# Patient Record
Sex: MALE | Race: WHITE | NOT HISPANIC OR LATINO | Employment: UNEMPLOYED | URBAN - METROPOLITAN AREA
[De-identification: names, ages, dates, MRNs, and addresses within clinical notes are randomized per-mention and may not be internally consistent; named-entity substitution may affect disease eponyms.]

---

## 2017-06-06 ENCOUNTER — OFFICE VISIT (OUTPATIENT)
Dept: MEDICAL GROUP | Facility: MEDICAL CENTER | Age: 52
End: 2017-06-06
Attending: FAMILY MEDICINE
Payer: MEDICAID

## 2017-06-06 ENCOUNTER — HOSPITAL ENCOUNTER (OUTPATIENT)
Dept: LAB | Facility: MEDICAL CENTER | Age: 52
End: 2017-06-06
Attending: FAMILY MEDICINE
Payer: MEDICAID

## 2017-06-06 VITALS
HEART RATE: 64 BPM | SYSTOLIC BLOOD PRESSURE: 126 MMHG | TEMPERATURE: 98.2 F | RESPIRATION RATE: 16 BRPM | DIASTOLIC BLOOD PRESSURE: 84 MMHG | WEIGHT: 183 LBS | HEIGHT: 67 IN | BODY MASS INDEX: 28.72 KG/M2 | OXYGEN SATURATION: 97 %

## 2017-06-06 DIAGNOSIS — G89.29 CHRONIC PAIN OF LEFT HEEL: ICD-10-CM

## 2017-06-06 DIAGNOSIS — G89.29 CHRONIC LEFT HIP PAIN: ICD-10-CM

## 2017-06-06 DIAGNOSIS — M25.552 CHRONIC LEFT HIP PAIN: ICD-10-CM

## 2017-06-06 DIAGNOSIS — Z79.899 HIGH RISK MEDICATION USE: ICD-10-CM

## 2017-06-06 DIAGNOSIS — M79.672 CHRONIC PAIN OF LEFT HEEL: ICD-10-CM

## 2017-06-06 DIAGNOSIS — K21.9 GASTROESOPHAGEAL REFLUX DISEASE WITHOUT ESOPHAGITIS: ICD-10-CM

## 2017-06-06 LAB
ALBUMIN SERPL BCP-MCNC: 4.1 G/DL (ref 3.2–4.9)
ALBUMIN/GLOB SERPL: 1.2 G/DL
ALP SERPL-CCNC: 67 U/L (ref 30–99)
ALT SERPL-CCNC: 10 U/L (ref 2–50)
ANION GAP SERPL CALC-SCNC: 6 MMOL/L (ref 0–11.9)
AST SERPL-CCNC: 13 U/L (ref 12–45)
BILIRUB SERPL-MCNC: 0.3 MG/DL (ref 0.1–1.5)
BUN SERPL-MCNC: 15 MG/DL (ref 8–22)
CALCIUM SERPL-MCNC: 9.5 MG/DL (ref 8.5–10.5)
CHLORIDE SERPL-SCNC: 105 MMOL/L (ref 96–112)
CO2 SERPL-SCNC: 27 MMOL/L (ref 20–33)
CREAT SERPL-MCNC: 0.75 MG/DL (ref 0.5–1.4)
GFR SERPL CREATININE-BSD FRML MDRD: >60 ML/MIN/1.73 M 2
GLOBULIN SER CALC-MCNC: 3.3 G/DL (ref 1.9–3.5)
GLUCOSE SERPL-MCNC: 87 MG/DL (ref 65–99)
POTASSIUM SERPL-SCNC: 4.2 MMOL/L (ref 3.6–5.5)
PROT SERPL-MCNC: 7.4 G/DL (ref 6–8.2)
SODIUM SERPL-SCNC: 138 MMOL/L (ref 135–145)

## 2017-06-06 PROCEDURE — 99212 OFFICE O/P EST SF 10 MIN: CPT | Performed by: FAMILY MEDICINE

## 2017-06-06 PROCEDURE — 99213 OFFICE O/P EST LOW 20 MIN: CPT | Performed by: FAMILY MEDICINE

## 2017-06-06 PROCEDURE — 80053 COMPREHEN METABOLIC PANEL: CPT

## 2017-06-06 PROCEDURE — 36415 COLL VENOUS BLD VENIPUNCTURE: CPT

## 2017-06-06 RX ORDER — IBUPROFEN 800 MG/1
800 TABLET ORAL EVERY 8 HOURS PRN
Qty: 180 TAB | Refills: 3 | Status: SHIPPED | OUTPATIENT
Start: 2017-06-06 | End: 2018-03-06 | Stop reason: SDUPTHER

## 2017-06-06 ASSESSMENT — PATIENT HEALTH QUESTIONNAIRE - PHQ9: CLINICAL INTERPRETATION OF PHQ2 SCORE: 0

## 2017-06-06 NOTE — PROGRESS NOTES
"Subjective:      Andrés Villalta is a 51 y.o. male who presents with Medical Advice; Medication Management; and Results            HPI 1. Chronic pain-patient reports that he enrolled in a methadone clinic at his home in [place] in January 2016, and current methadone dose has been reduced down to 24 mg daily. He attends clinic 3 times per week with daily methadone dosing. Patient reports that he has been chronically taking ibuprofen 800 mg twice daily to deal with the pain generators (low back, left hip, left foot). He reports he was successfully treated for hepatitis C earlier this year with a negative posttreatment hep C RNA assay performed by lab core in [place].  2. GERD-patient notes about 2-3 episodes per month of epigastric burning. Not reporting any black or bloody stools. No recent emesis. Reports normal appetite.    ROS negative for constipation, chest pain, epistaxis       Objective:     /84 mmHg  Pulse 64  Temp(Src) 36.8 °C (98.2 °F)  Resp 16  Ht 1.702 m (5' 7.01\")  Wt 83.008 kg (183 lb)  BMI 28.66 kg/m2  SpO2 97%     Physical Exam  Gen.- alert, cooperative, in no acute distress  Neck- midline trachea, thyroid not enlarged or tender,supple, no cervical adenopathy  Chest-clear to auscultation and percussion with normal breath sounds. No retractions. Chest wall nontender  Cardiac- regular rhythm and rate. No murmur, thrill, or heave  Abdomen- normal bowel sounds, soft without guarding. Liver and spleen not enlarged, no palpable masses or tenderness.  Back-1+ tender to palpation in the bony midline L4-S3. No overlying redness or swelling          Assessment/Plan:     1. Chronic left hip pain      2. Chronic pain of left heel      3. Gastroesophageal reflux disease without esophagitis      4. High risk medication use    - COMP METABOLIC PANEL; Future    Plan: 1. Patient is congratulated on his progress at reducing daily narcotic exposure  2. Check CMP now and every 6 months  3. Add " Nexium 24-hour OTC 20 mg once daily  4. Revisit 6 months  5. Rx ibuprofen 800 mg twice a day with food-GI precautions reviewed

## 2017-06-06 NOTE — MR AVS SNAPSHOT
"        Andrés Villalta   2017 1:10 PM   Office Visit   MRN: [id number]    [facility]   Dept Phone:  [phone]    Description:  Male : 1965   Provider:  Zaire Celis M.D.           Reason for Visit     Medical Advice     Medication Management     Results           Allergies as of 2017     No Known Allergies      You were diagnosed with     Chronic left hip pain   [824710]       Chronic pain of left heel   [8347793]       Gastroesophageal reflux disease without esophagitis   [345272]       High risk medication use   [874823]         Vital Signs     Blood Pressure Pulse Temperature Respirations Height Weight    126/84 mmHg 64 36.8 °C (98.2 °F) 16 1.702 m (5' 7.01\") 83.008 kg (183 lb)    Body Mass Index Oxygen Saturation Smoking Status             28.66 kg/m2 97% Current Some Day Smoker         Basic Information     Date Of Birth Sex Race Ethnicity Preferred Language    1965 Male White Non- English      Problem List              ICD-10-CM Priority Class Noted - Resolved    Heroin user F11.90   2014 - Present    Calcaneal fracture S92.009A   Unknown - Present    Anxiety F41.9   2015 - Present    Recurrent headache R51   2015 - Present    Chronic pain of left heel M79.672, G89.29   2015 - Present    Chronic left hip pain M25.552, G89.29   2015 - Present    Hepatitis C B19.20   2015 - Present    Hepatitis C B19.20   Unknown - Present    Gastroesophageal reflux disease without esophagitis K21.9   2017 - Present      Health Maintenance        Date Due Completion Dates    IMM DTaP/Tdap/Td Vaccine (1 - Tdap) 1984 ---    COLONOSCOPY 2015 ---            Current Immunizations     Influenza Vaccine Quad Inj (Pf) 10/29/2015      Below and/or attached are the medications your provider expects you to take. Review all of your home medications and newly ordered medications with your provider and/or pharmacist. Follow medication " instructions as directed by your provider and/or pharmacist. Please keep your medication list with you and share with your provider. Update the information when medications are discontinued, doses are changed, or new medications (including over-the-counter products) are added; and carry medication information at all times in the event of emergency situations     Allergies:  No Known Allergies          Medications  Valid as of: June 06, 2017 -  1:46 PM    Generic Name Brand Name Tablet Size Instructions for use    ALPRAZolam (Tab) XANAX 0.5 MG Tapering schedule-1 pill up to 3 times per day ×7 days, 2 pills per day ×7 days, 1 pill per day ×7 days and discontinue        Esomeprazole Magnesium (CAPSULE DELAYED RELEASE) NEXIUM 20 MG Take 1 Cap by mouth every morning before breakfast.        HYDROmorphone HCl (Tab) DILAUDID 4 MG Tapering schedule-3 tablets per 24 hours ×3 days, to per 24 hours ×3 days, one per 24 hours ×3 days and stop        Ibuprofen (Tab) MOTRIN 800 MG Take 1 Tab by mouth every 8 hours as needed.        Ibuprofen (Tab) MOTRIN 800 MG Take 1 Tab by mouth every 8 hours as needed.        Meloxicam (Tab) MOBIC 15 MG TAKE 1 TABLET BY MOUTH EVERY DAY        .                 Medicines prescribed today were sent to:     [facility]/PHARMACY #[id number] - [place], [place] - [address]    [address] [address]    Phone: [phone] Fax: [phone]    Open 24 Hours?: No      Medication refill instructions:       If your prescription bottle indicates you have medication refills left, it is not necessary to call your provider’s office. Please contact your pharmacy and they will refill your medication.    If your prescription bottle indicates you do not have any refills left, you may request refills at any time through one of the following ways: The online [employer] system (except Urgent Care), by calling your provider’s office, or by asking your pharmacy to contact your provider’s  office with a refill request. Medication refills are processed only during regular business hours and may not be available until the next business day. Your provider may request additional information or to have a follow-up visit with you prior to refilling your medication.   *Please Note: Medication refills are assigned a new Rx number when refilled electronically. Your pharmacy may indicate that no refills were authorized even though a new prescription for the same medication is available at the pharmacy. Please request the medicine by name with the pharmacy before contacting your provider for a refill.        Your To Do List     Future Labs/Procedures Complete By Expires    COMP METABOLIC PANEL  As directed 6/7/2018      Other Notes About Your Plan     Inconsistent UDS-no further controlled substance prescriptions           MyChart Status: Patient Declined        Quit Tobacco Information     Do you want to quit using tobacco?    Quitting tobacco decreases risks of cancer, heart and lung disease, increases life expectancy, improves sense of taste and smell, and increases spending money, among other benefits.    If you are thinking about quitting, we can help.  • [facility] Quit Tobacco Program: [phone]  o Program occurs weekly for four weeks and includes pharmacist consultation on products to support quitting smoking or chewing tobacco. A provider referral is needed for pharmacist consultation.  • Tobacco Users Help Hotline: 3-477-QUIT-NOW (866-4606) or https://[place].quitlogix.org/  o Free, confidential telephone and online coaching for [place] residents. Sessions are designed on a schedule that is convenient for you. Eligible clients receive free nicotine replacement therapy.  • Nationally: www.smokefree.gov  o Information and professional assistance to support both immediate and long-term needs as you become, and remain, a non-smoker. Smokefree.gov allows you to choose the help that best fits your needs.

## 2017-06-09 ENCOUNTER — TELEPHONE (OUTPATIENT)
Dept: MEDICAL GROUP | Facility: MEDICAL CENTER | Age: 52
End: 2017-06-09

## 2017-06-09 NOTE — TELEPHONE ENCOUNTER
----- Message from Zaire Celis M.D. sent at 6/8/2017 12:41 PM PDT -----  Normal kidney and liver function.

## 2017-08-15 ENCOUNTER — OFFICE VISIT (OUTPATIENT)
Dept: MEDICAL GROUP | Facility: MEDICAL CENTER | Age: 52
End: 2017-08-15
Attending: FAMILY MEDICINE
Payer: MEDICAID

## 2017-08-15 VITALS
SYSTOLIC BLOOD PRESSURE: 128 MMHG | WEIGHT: 182 LBS | RESPIRATION RATE: 18 BRPM | HEART RATE: 78 BPM | BODY MASS INDEX: 28.56 KG/M2 | TEMPERATURE: 97.9 F | DIASTOLIC BLOOD PRESSURE: 78 MMHG | OXYGEN SATURATION: 97 % | HEIGHT: 67 IN

## 2017-08-15 DIAGNOSIS — F41.9 ANXIETY: ICD-10-CM

## 2017-08-15 DIAGNOSIS — K13.0 LESION OF LIP: ICD-10-CM

## 2017-08-15 PROCEDURE — 99214 OFFICE O/P EST MOD 30 MIN: CPT | Performed by: FAMILY MEDICINE

## 2017-08-15 PROCEDURE — 99213 OFFICE O/P EST LOW 20 MIN: CPT | Performed by: FAMILY MEDICINE

## 2017-08-15 RX ORDER — ALPRAZOLAM 0.5 MG/1
0.5 TABLET ORAL
Qty: 30 TAB | Refills: 0 | Status: SHIPPED
Start: 2017-08-15 | End: 2017-09-14 | Stop reason: SDUPTHER

## 2017-08-15 NOTE — MR AVS SNAPSHOT
"        Andrés Villalta   8/15/2017 2:10 PM   Office Visit   MRN: [id number]    Department:  [facility]   Dept Phone:  [phone]    Description:  Male : 1965   Provider:  Zaire Celis M.D.           Reason for Visit     Anxiety Here to follow up on medication      Allergies as of 8/15/2017     No Known Allergies      You were diagnosed with     Anxiety   [139514]       Lesion of lip   [192040]         Vital Signs     Blood Pressure Pulse Temperature Respirations Height Weight    128/78 mmHg 78 36.6 °C (97.9 °F) 18 1.702 m (5' 7\") 82.555 kg (182 lb)    Body Mass Index Oxygen Saturation Smoking Status             28.50 kg/m2 97% Current Some Day Smoker         Basic Information     Date Of Birth Sex Race Ethnicity Preferred Language    1965 Male White Non- English      Your appointments     Sep 15, 2017  2:30 PM   Established Patient with Zaire Celis M.D.   [facility] ([facility])    [address] [place] [id number]   [phone]           You will be receiving a confirmation call a few days before your appointment from our automated call confirmation system.              Problem List              ICD-10-CM Priority Class Noted - Resolved    Heroin user F11.90   2014 - Present    Calcaneal fracture S92.009A   Unknown - Present    Anxiety F41.9   2015 - Present    Recurrent headache R51   2015 - Present    Chronic pain of left heel M79.672, G89.29   2015 - Present    Chronic left hip pain M25.552, G89.29   2015 - Present    Hepatitis C B19.20   2015 - Present    Hepatitis C B19.20   Unknown - Present    Gastroesophageal reflux disease without esophagitis K21.9   2017 - Present      Health Maintenance        Date Due Completion Dates    IMM DTaP/Tdap/Td Vaccine (1 - Tdap) 1984 ---    IMM INFLUENZA (1) 2017 10/29/2015    COLONOSCOPY 2027            Current Immunizations     Influenza Vaccine Quad Inj " (Pf) 10/29/2015      Below and/or attached are the medications your provider expects you to take. Review all of your home medications and newly ordered medications with your provider and/or pharmacist. Follow medication instructions as directed by your provider and/or pharmacist. Please keep your medication list with you and share with your provider. Update the information when medications are discontinued, doses are changed, or new medications (including over-the-counter products) are added; and carry medication information at all times in the event of emergency situations     Allergies:  No Known Allergies          Medications  Valid as of: August 15, 2017 -  2:53 PM    Generic Name Brand Name Tablet Size Instructions for use    ALPRAZolam (Tab) XANAX 0.5 MG Take 1 Tab by mouth 1 time daily as needed for Sleep. Indications: Feeling Anxious        Esomeprazole Magnesium (CAPSULE DELAYED RELEASE) NEXIUM 20 MG Take 1 Cap by mouth every morning before breakfast.        HYDROmorphone HCl (Tab) DILAUDID 4 MG Tapering schedule-3 tablets per 24 hours ×3 days, to per 24 hours ×3 days, one per 24 hours ×3 days and stop        Ibuprofen (Tab) MOTRIN 800 MG Take 1 Tab by mouth every 8 hours as needed.        Ibuprofen (Tab) MOTRIN 800 MG Take 1 Tab by mouth every 8 hours as needed.        Meloxicam (Tab) MOBIC 15 MG TAKE 1 TABLET BY MOUTH EVERY DAY        .                 Medicines prescribed today were sent to:     [facility]/PHARMACY #[id number] - [place], [place] - [address]    [address] [address]    Phone: [phone] Fax: [phone]    Open 24 Hours?: No      Medication refill instructions:       If your prescription bottle indicates you have medication refills left, it is not necessary to call your provider’s office. Please contact your pharmacy and they will refill your medication.    If your prescription bottle indicates you do not have any refills left, you may request  refills at any time through one of the following ways: The online [employer] system (except Urgent Care), by calling your provider’s office, or by asking your pharmacy to contact your provider’s office with a refill request. Medication refills are processed only during regular business hours and may not be available until the next business day. Your provider may request additional information or to have a follow-up visit with you prior to refilling your medication.   *Please Note: Medication refills are assigned a new Rx number when refilled electronically. Your pharmacy may indicate that no refills were authorized even though a new prescription for the same medication is available at the pharmacy. Please request the medicine by name with the pharmacy before contacting your provider for a refill.        Your To Do List     Future Labs/Procedures Complete By [employer] PAIN MANAGEMENT SCREEN  As directed 8/15/2018    Comments:    Current Meds (name, sig, last dose):   Current outpatient prescriptions:   •  ibuprofen, 800 mg, Oral, Q8HRS PRN  •  esomeprazole, 20 mg, Oral, QAM AC  •  HYDROmorphone, Tapering schedule-3 tablets per 24 hours ×3 days, to per 24 hours ×3 days, one per 24 hours ×3 days and stop  •  ibuprofen, 800 mg, Oral, Q8HRS PRN  •  meloxicam, TAKE 1 TABLET BY MOUTH EVERY DAY            Referral     A referral request has been sent to our patient care coordination department. Please allow 3-5 business days for us to process this request and contact you either by phone or mail. If you do not hear from us by the 5th business day, please call us at [phone].        Other Notes About Your Plan     Inconsistent UDS-no further controlled substance prescriptions           MyChart Access Code: Activation code not generated  Current [facility] Status: Active          Quit Tobacco Information     Do you want to quit using tobacco?    Quitting tobacco decreases risks of cancer, heart and lung disease,  increases life expectancy, improves sense of taste and smell, and increases spending money, among other benefits.    If you are thinking about quitting, we can help.  • [facility] Quit Tobacco Program: 228.636.3080  o Program occurs weekly for four weeks and includes pharmacist consultation on products to support quitting smoking or chewing tobacco. A provider referral is needed for pharmacist consultation.  • Tobacco Users Help Hotline: 6-968-QUIT-NOW (093-8963) or https://[place].quitlogix.org/  o Free, confidential telephone and online coaching for [place] residents. Sessions are designed on a schedule that is convenient for you. Eligible clients receive free nicotine replacement therapy.  • Nationally: www.smokefree.gov  o Information and professional assistance to support both immediate and long-term needs as you become, and remain, a non-smoker. Smokefree.gov allows you to choose the help that best fits your needs.

## 2017-08-15 NOTE — PROGRESS NOTES
"Subjective:      Andrés Villalta is a 51 y.o. male who presents with Anxiety            Anxiety         1. Anxiety-patient notes ongoing trials with daily anxiety. He requests restarting a very limited dose of Xanax 0.5 mg. He reports that due to previously saved medications actually been taking that dose typically once a day with good results. It helps to reduce racing thoughts, and overt nervousness.  2. Lip lesion-patient reports 3 week history of a persistent tender white papule that he thought was a canker sore. He reports that it will tend to cough up and fill with a little bit of clear fluid and he has squeezed that out on 2 separate occasions. Has not noticed any traditional pus, not having any fever.  ROS no recalled mouth trauma, sore throat, chest pain       Objective:     /78 mmHg  Pulse 78  Temp(Src) 36.6 °C (97.9 °F)  Resp 18  Ht 1.702 m (5' 7\")  Wt 82.555 kg (182 lb)  BMI 28.50 kg/m2  SpO2 97%     Physical Exam  Gen.- alert, cooperative, in no acute distress  Neck- midline trachea, thyroid not enlarged or tender,supple, no cervical adenopathy  Chest-clear to auscultation and percussion with normal breath sounds. No retractions. Chest wall nontender  Cardiac- regular rhythm and rate. No murmur, thrill, or heave  Psych-normal affect with good eye contact. Normal grooming. Oriented x4.  Asryenbzbf-9-3 mm sharply circumscribed round papule raised in the central portion, seen in the lower inner aspect of the middle of the left side of his lower lip. No other lesions are seen in the oropharynx. Mucosa is moist. Tongue is midline. Submandibular areas are slightly thickened mid mandible bilaterally without clear-cut nodularity          Assessment/Plan:     1. Anxiety    - Controlled Substance Treatment Agreement  - [facility] PAIN MANAGEMENT SCREEN; Future    2. Lesion of lip    Plan: 1. UDS, CSA today  2. Begin alprazolam 0.5 mg once daily-patient is not taking any opiates although he " does recall several doses of Tylenol with Codeine given just transiently a week ago for a dental procedure.  3. May continue on ibuprofen chronically for back and hip pain  4. ENT referral to evaluate lip lesion  5. Revisit with me in one month

## 2017-08-25 ENCOUNTER — PATIENT MESSAGE (OUTPATIENT)
Dept: MEDICAL GROUP | Facility: MEDICAL CENTER | Age: 52
End: 2017-08-25

## 2017-08-25 ENCOUNTER — TELEPHONE (OUTPATIENT)
Dept: MEDICAL GROUP | Facility: MEDICAL CENTER | Age: 52
End: 2017-08-25

## 2017-08-26 NOTE — TELEPHONE ENCOUNTER
----- Message from Zaire Celis M.D. sent at 8/25/2017  5:08 PM PDT -----  Urine drug screen is markedly positive for unexpected residue of marijuana, and heroin metabolites.Pt also does have methadone metabolites consistent with attending methadone clinic. He should discontinue any further use of Xanax/benzodiazepines as these can combine with his opiate exposure to stop his breathing. We will not prescribe further controlled substances but remain available to treat his future medical issues. Recommend he discontinue any heroin use immediately. ( Pt denies any heroin use ).

## 2017-08-26 NOTE — TELEPHONE ENCOUNTER
Phone Number Called: [phone] (home)       Message:Pt informed, and states that he has been [facility] in the last 2 weeks and has tested clean, he thinks our results are wrong he was tested as recently as Monday. He is willing to test again.  Urine drug screen is markedly positive for unexpected residue of marijuana, heroin, heroin metabolites. Patient did not share this information with this. He should discontinue any further use of Xanax/benzodiazepines as these can combine with his opiate exposure to stop his breathing. We will not prescribe further controlled substances but remain available to treat his future medical issues. Recommend he discontinue heroin use immediately.     Left Message for patient to call back: N\A

## 2017-09-14 ENCOUNTER — OFFICE VISIT (OUTPATIENT)
Dept: MEDICAL GROUP | Facility: MEDICAL CENTER | Age: 52
End: 2017-09-14
Attending: FAMILY MEDICINE
Payer: MEDICAID

## 2017-09-14 VITALS
WEIGHT: 181 LBS | OXYGEN SATURATION: 98 % | TEMPERATURE: 98.1 F | HEART RATE: 80 BPM | HEIGHT: 67 IN | BODY MASS INDEX: 28.41 KG/M2 | DIASTOLIC BLOOD PRESSURE: 80 MMHG | SYSTOLIC BLOOD PRESSURE: 140 MMHG | RESPIRATION RATE: 16 BRPM

## 2017-09-14 DIAGNOSIS — F41.9 ANXIETY: ICD-10-CM

## 2017-09-14 DIAGNOSIS — F11.90 METHADONE USE: ICD-10-CM

## 2017-09-14 PROCEDURE — 99213 OFFICE O/P EST LOW 20 MIN: CPT | Performed by: FAMILY MEDICINE

## 2017-09-14 RX ORDER — ALPRAZOLAM 0.5 MG/1
0.5 TABLET ORAL
Qty: 30 TAB | Refills: 0 | Status: SHIPPED | OUTPATIENT
Start: 2017-09-14 | End: 2017-10-19 | Stop reason: SDUPTHER

## 2017-09-14 ASSESSMENT — PAIN SCALES - GENERAL: PAINLEVEL: NO PAIN

## 2017-09-14 NOTE — PROGRESS NOTES
"Subjective:      Andrés Villalta is a 52 y.o. male who presents with Anxiety            HPI 1. Anxiety-patient notes strong levels of anxiety that disturb falling and maintaining sleep. He reports that sleep has been much better since he has been taking Xanax recently as little as 0.5 mg at bedtime. When last months Xanax prescription was authorized I had forgotten it patient has been on methadone since April 2016. Currently he has reduced his methadone dose down to 30 mg and has been stable at that level for approximately 2 months. He is attending a clinic in [place]. He is also scheduled to see a counselor in [place] regarding anxiety and sleep issues.  2. Methadone dependence-patient reports that in the distant past he had taken methadone at a dose of 60 mg daily. Also reports in the distant past he had taken much higher doses of Xanax being prescribed 2 mg 3 times daily by a previous physician. He denies any difficulty breathing, cough, wheeze.    ROS negative for unexplained diaphoresis, pruritus, nasal congestion       Objective:     /80   Pulse 80   Temp 36.7 °C (98.1 °F)   Resp 16   Ht 1.702 m (5' 7\")   Wt 82.1 kg (181 lb)   SpO2 98%   BMI 28.35 kg/m²      Physical Exam  Gen.- alert, cooperative, in no acute distress  Neck- midline trachea, thyroid not enlarged or tender,supple, no cervical adenopathy  Chest-clear to auscultation and percussion with normal breath sounds. No retractions. Chest wall nontender  Cardiac- regular rhythm and rate. No murmur, thrill, or heave  Psych-normal affect with good eye contact. Normal grooming. Oriented x4.  [facility] EDS-reviewed     Obtained and reviewed patient utilization report from State Pharmacy database on9/14/17, and based on assessment of report, the prescription for Xanax 0.5 mg is medically necessary       Assessment/Plan:     1. Anxiety      2. Methadone use (CMS-[facility])    Plan: 1. Patient believes he may be able to transfer his " Xanax prescription to the upcoming psychology clinic he is entering in [place]  2. Discussed the potential for respiratory depression combining Xanax with benzodiazepines-in the past patient has taken significantly higher of both medications with no obvious respiratory consequences. Will renew Xanax 0.5 mg, #30 this month.

## 2017-10-19 ENCOUNTER — OFFICE VISIT (OUTPATIENT)
Dept: MEDICAL GROUP | Facility: MEDICAL CENTER | Age: 52
End: 2017-10-19
Attending: FAMILY MEDICINE
Payer: MEDICAID

## 2017-10-19 VITALS
HEIGHT: 67 IN | RESPIRATION RATE: 16 BRPM | BODY MASS INDEX: 28.25 KG/M2 | SYSTOLIC BLOOD PRESSURE: 122 MMHG | HEART RATE: 72 BPM | OXYGEN SATURATION: 98 % | TEMPERATURE: 97.5 F | DIASTOLIC BLOOD PRESSURE: 80 MMHG | WEIGHT: 180 LBS

## 2017-10-19 DIAGNOSIS — F11.90 METHADONE USE: ICD-10-CM

## 2017-10-19 DIAGNOSIS — F41.9 ANXIETY: ICD-10-CM

## 2017-10-19 PROCEDURE — 99213 OFFICE O/P EST LOW 20 MIN: CPT | Performed by: FAMILY MEDICINE

## 2017-10-19 RX ORDER — ALPRAZOLAM 0.5 MG/1
0.5 TABLET ORAL
Qty: 30 TAB | Refills: 0 | Status: SHIPPED | OUTPATIENT
Start: 2017-10-19 | End: 2017-11-17 | Stop reason: SDUPTHER

## 2017-10-19 ASSESSMENT — PAIN SCALES - GENERAL: PAINLEVEL: NO PAIN

## 2017-10-19 NOTE — PROGRESS NOTES
"Subjective:      Andrés Villalta is a 52 y.o. male who presents with Anxiety            HPI 1. Anxiety-patient reports he is continuing to see Froilan, his counselor every Tuesday, in [place]. He Is Scheduled to See His New Psychiatrist, Dr. Alvarez, on 10/31/17. Patient anticipates Dr. Alvarez will take over prescribing his Xanax. Currently taking Xanax 0.5 mg typically at bedtime on most days. Reports he is getting 4-5 hours of sleep with some sleep fragmentation. Reports this is about average for him. He continues to take a daily dose of methadone but has been decreasing the dose by 1 mg less each week currently down to 26 mg daily.    ROS negative for tearfulness, confusion, headache       Objective:     /80   Pulse 72   Temp 36.4 °C (97.5 °F)   Resp 16   Ht 1.702 m (5' 7.01\")   Wt 81.6 kg (180 lb)   SpO2 98%   BMI 28.19 kg/m²      Physical Exam  Gen.- alert, cooperative, in no acute distress  Neck- midline trachea, thyroid not enlarged or tender,supple, no cervical adenopathy  Chest-clear to auscultation and percussion with normal breath sounds. No retractions. Chest wall nontender  Cardiac- regular rhythm and rate. No murmur, thrill, or heave      Attempted to obtain patient utilization report from the State Pharmacy database on 10/19/17. Unable to access the database at this time. Based on assessment of patient and review of available medical record the prescription for Alprazolam 0.5 mg is medically necessary         Assessment/Plan:     1. Anxiety      2. Methadone use (CMS-[facility])     Plan: 1.  could not be accessed today  2. Renew Xanax 0.5 mg, #30-1 by mouth daily as needed for anxiety  3. Follow-up with new psychiatrist    "

## 2017-11-17 RX ORDER — ALPRAZOLAM 0.5 MG/1
0.5 TABLET ORAL
Qty: 30 TAB | Refills: 0 | Status: SHIPPED | OUTPATIENT
Start: 2017-11-17 | End: 2019-01-07

## 2017-11-17 NOTE — TELEPHONE ENCOUNTER
Was the patient seen in the last year in this department? Yes     Does patient have an active prescription for medications requested? No     Received Request Via: Patient, pt has an appointment with his psychiatrist on November 28 th.

## 2018-03-06 RX ORDER — IBUPROFEN 800 MG/1
800 TABLET ORAL EVERY 8 HOURS PRN
Qty: 180 TAB | Refills: 1 | Status: SHIPPED | OUTPATIENT
Start: 2018-03-06 | End: 2019-01-07 | Stop reason: SDUPTHER

## 2018-09-04 RX ORDER — IBUPROFEN 800 MG/1
800 TABLET ORAL EVERY 8 HOURS PRN
Qty: 90 TAB | Refills: 1 | Status: SHIPPED | OUTPATIENT
Start: 2018-09-04 | End: 2018-11-02 | Stop reason: SDUPTHER

## 2018-12-03 RX ORDER — IBUPROFEN 800 MG/1
TABLET ORAL
Qty: 90 TAB | Refills: 0 | Status: SHIPPED | OUTPATIENT
Start: 2018-12-03 | End: 2019-01-03 | Stop reason: SDUPTHER

## 2019-01-03 RX ORDER — IBUPROFEN 800 MG/1
800 TABLET ORAL EVERY 8 HOURS PRN
Qty: 68 TAB | Refills: 0 | Status: SHIPPED | OUTPATIENT
Start: 2019-01-03 | End: 2019-01-07

## 2019-01-03 NOTE — TELEPHONE ENCOUNTER
1. Caller Name: Andrés Villalta                                           Call Back Number: [phone] (home)         Patient approves a detailed voicemail message: yes    Pt called and wanted to know why his Ibuprofen was denied by the office. Told pt apt was necessary. Pt has scheduled his apt for 01/21/18, that is the soonest pt is able to come into the office for both providers schedule and pt's schedule. Pt is requesting an Rx for ibuprofen to last until pt is seen. Pt is currently out of Rx. Please advise.

## 2019-01-07 ENCOUNTER — OFFICE VISIT (OUTPATIENT)
Dept: MEDICAL GROUP | Facility: MEDICAL CENTER | Age: 54
End: 2019-01-07
Attending: FAMILY MEDICINE
Payer: MEDICAID

## 2019-01-07 VITALS
HEART RATE: 80 BPM | TEMPERATURE: 97.6 F | WEIGHT: 180 LBS | RESPIRATION RATE: 16 BRPM | BODY MASS INDEX: 28.25 KG/M2 | SYSTOLIC BLOOD PRESSURE: 128 MMHG | HEIGHT: 67 IN | OXYGEN SATURATION: 97 % | DIASTOLIC BLOOD PRESSURE: 72 MMHG

## 2019-01-07 DIAGNOSIS — M25.552 CHRONIC LEFT HIP PAIN: ICD-10-CM

## 2019-01-07 DIAGNOSIS — G89.29 CHRONIC LEFT HIP PAIN: ICD-10-CM

## 2019-01-07 DIAGNOSIS — Z51.81 ENCOUNTER FOR MONITORING CHRONIC NSAID THERAPY: ICD-10-CM

## 2019-01-07 DIAGNOSIS — Z79.1 ENCOUNTER FOR MONITORING CHRONIC NSAID THERAPY: ICD-10-CM

## 2019-01-07 PROCEDURE — 99212 OFFICE O/P EST SF 10 MIN: CPT | Performed by: FAMILY MEDICINE

## 2019-01-07 PROCEDURE — 99213 OFFICE O/P EST LOW 20 MIN: CPT | Performed by: FAMILY MEDICINE

## 2019-01-07 RX ORDER — IBUPROFEN 800 MG/1
800 TABLET ORAL EVERY 8 HOURS PRN
Qty: 270 TAB | Refills: 3 | Status: SHIPPED | OUTPATIENT
Start: 2019-01-07 | End: 2019-12-12 | Stop reason: SDUPTHER

## 2019-01-08 NOTE — PROGRESS NOTES
"Subjective:      Andrés Villalta is a 53 y.o. male who presents with Follow-Up            HPI 1.  Chronic hip pain-patient reports that methadone has been tapered down to 20 mg daily.  He reports he takes no benzodiazepines any longer.  He is followed in [place] for the methadone therapy.  He does also continue to take ibuprofen 800 mg 3 times daily to help control hip pain.  He does have a chronic limp and uses a cane for assistance.  There have been no recent falls.  There is no urinary or fecal incontinence    ROS negative for black stools, bloody stools, nausea, epigastric pain, water brash       Objective:     /72 (BP Location: Left arm, Patient Position: Sitting, BP Cuff Size: Adult)   Pulse 80   Temp 36.4 °C (97.6 °F) (Temporal)   Resp 16   Ht 1.702 m (5' 7\")   Wt 81.6 kg (180 lb)   SpO2 97%   BMI 28.19 kg/m²      Physical Exam  Gen.- alert, cooperative, in no acute distress  Neck- midline trachea, thyroid not enlarged or tender,supple, no cervical adenopathy  Chest-clear to auscultation and percussion with normal breath sounds. No retractions. Chest wall nontender  Cardiac- regular rhythm and rate. No murmur, thrill, or heave  Abdomen- normal bowel sounds, soft without guarding. Liver and spleen not enlarged, no palpable masses or tenderness.  Psych-normal affect with good eye contact. Normal grooming. Oriented x4.            Assessment/Plan:     1. Chronic left hip pain      2. Encounter for monitoring chronic NSAID therapy    - COMP METABOLIC PANEL; Future  - CBC WITH DIFFERENTIAL; Future    Plan: 1.  Check CMP, CBC  2.  Rx ibuprofen 800 mg #270 with 3 refills  3.  Revisit within 6-12 months or sooner if needed  "

## 2019-10-30 ENCOUNTER — HOSPITAL ENCOUNTER (OUTPATIENT)
Dept: LAB | Facility: MEDICAL CENTER | Age: 54
End: 2019-10-30
Attending: FAMILY MEDICINE
Payer: MEDICAID

## 2019-10-30 DIAGNOSIS — Z51.81 ENCOUNTER FOR MONITORING CHRONIC NSAID THERAPY: ICD-10-CM

## 2019-10-30 DIAGNOSIS — Z79.1 ENCOUNTER FOR MONITORING CHRONIC NSAID THERAPY: ICD-10-CM

## 2019-10-30 LAB
BASOPHILS # BLD AUTO: 0.4 % (ref 0–1.8)
BASOPHILS # BLD: 0.03 K/UL (ref 0–0.12)
EOSINOPHIL # BLD AUTO: 0.11 K/UL (ref 0–0.51)
EOSINOPHIL NFR BLD: 1.6 % (ref 0–6.9)
ERYTHROCYTE [DISTWIDTH] IN BLOOD BY AUTOMATED COUNT: 43.5 FL (ref 35.9–50)
HCT VFR BLD AUTO: 41.9 % (ref 42–52)
HGB BLD-MCNC: 13.3 G/DL (ref 14–18)
IMM GRANULOCYTES # BLD AUTO: 0.03 K/UL (ref 0–0.11)
IMM GRANULOCYTES NFR BLD AUTO: 0.4 % (ref 0–0.9)
LYMPHOCYTES # BLD AUTO: 1.93 K/UL (ref 1–4.8)
LYMPHOCYTES NFR BLD: 28.5 % (ref 22–41)
MCH RBC QN AUTO: 28 PG (ref 27–33)
MCHC RBC AUTO-ENTMCNC: 31.7 G/DL (ref 33.7–35.3)
MCV RBC AUTO: 88.2 FL (ref 81.4–97.8)
MONOCYTES # BLD AUTO: 0.55 K/UL (ref 0–0.85)
MONOCYTES NFR BLD AUTO: 8.1 % (ref 0–13.4)
NEUTROPHILS # BLD AUTO: 4.13 K/UL (ref 1.82–7.42)
NEUTROPHILS NFR BLD: 61 % (ref 44–72)
NRBC # BLD AUTO: 0 K/UL
NRBC BLD-RTO: 0 /100 WBC
PLATELET # BLD AUTO: 229 K/UL (ref 164–446)
PMV BLD AUTO: 10.8 FL (ref 9–12.9)
RBC # BLD AUTO: 4.75 M/UL (ref 4.7–6.1)
WBC # BLD AUTO: 6.8 K/UL (ref 4.8–10.8)

## 2019-10-30 PROCEDURE — 36415 COLL VENOUS BLD VENIPUNCTURE: CPT

## 2019-10-30 PROCEDURE — 85025 COMPLETE CBC W/AUTO DIFF WBC: CPT

## 2019-10-31 ENCOUNTER — PATIENT MESSAGE (OUTPATIENT)
Dept: MEDICAL GROUP | Facility: MEDICAL CENTER | Age: 54
End: 2019-10-31

## 2019-10-31 ENCOUNTER — TELEPHONE (OUTPATIENT)
Dept: MEDICAL GROUP | Facility: MEDICAL CENTER | Age: 54
End: 2019-10-31

## 2019-10-31 DIAGNOSIS — D64.9 NORMOCYTIC NORMOCHROMIC ANEMIA: ICD-10-CM

## 2019-12-12 ENCOUNTER — OFFICE VISIT (OUTPATIENT)
Dept: MEDICAL GROUP | Facility: MEDICAL CENTER | Age: 54
End: 2019-12-12
Attending: FAMILY MEDICINE
Payer: MEDICAID

## 2019-12-12 VITALS
HEIGHT: 67 IN | OXYGEN SATURATION: 98 % | HEART RATE: 64 BPM | SYSTOLIC BLOOD PRESSURE: 122 MMHG | TEMPERATURE: 97.8 F | WEIGHT: 184 LBS | BODY MASS INDEX: 28.88 KG/M2 | RESPIRATION RATE: 16 BRPM | DIASTOLIC BLOOD PRESSURE: 78 MMHG

## 2019-12-12 DIAGNOSIS — Z23 NEEDS FLU SHOT: ICD-10-CM

## 2019-12-12 DIAGNOSIS — D64.9 NORMOCYTIC NORMOCHROMIC ANEMIA: ICD-10-CM

## 2019-12-12 DIAGNOSIS — Z23 NEED FOR VACCINATION AGAINST STREPTOCOCCUS PNEUMONIAE: ICD-10-CM

## 2019-12-12 DIAGNOSIS — G89.29 CHRONIC LEFT HIP PAIN: ICD-10-CM

## 2019-12-12 DIAGNOSIS — M25.552 CHRONIC LEFT HIP PAIN: ICD-10-CM

## 2019-12-12 DIAGNOSIS — Z79.899 HIGH RISK MEDICATION USE: ICD-10-CM

## 2019-12-12 PROCEDURE — 90686 IIV4 VACC NO PRSV 0.5 ML IM: CPT | Performed by: FAMILY MEDICINE

## 2019-12-12 PROCEDURE — 99212 OFFICE O/P EST SF 10 MIN: CPT | Performed by: FAMILY MEDICINE

## 2019-12-12 PROCEDURE — 90732 PPSV23 VACC 2 YRS+ SUBQ/IM: CPT | Performed by: FAMILY MEDICINE

## 2019-12-12 PROCEDURE — 99213 OFFICE O/P EST LOW 20 MIN: CPT | Performed by: FAMILY MEDICINE

## 2019-12-12 RX ORDER — IBUPROFEN 800 MG/1
800 TABLET ORAL EVERY 8 HOURS PRN
Qty: 270 TAB | Refills: 3 | Status: SHIPPED | OUTPATIENT
Start: 2019-12-12 | End: 2020-12-02

## 2019-12-12 NOTE — PROGRESS NOTES
"Subjective:      Andrés Villalta is a 54 y.o. male who presents with Follow-Up            HPI 1.  Chronic left hip pain-patient reports he continues on methadone at 30 mg daily.  He greatly enjoys the weekly counseling that he gets and is uncomfortable with the thought of eliminating his methadone dose and losing access to the counselor.  Also is taking ibuprofen 800 mg 3 times daily.  Not reporting any GI upset, black or bloody stools or loss of appetite.  2.  Anemia-patient has not noticed any visible blood in his stool, urine, or recurrent prolonged epistaxis.  Labs at the end of October was notable for mild dip in hemoglobin at 13.3.  Patient does not recall prior history of anemia    ROS negative for dyspnea, chest pain, abdominal pain       Objective:     /78 (BP Location: Left arm, Patient Position: Sitting, BP Cuff Size: Adult)   Pulse 64   Temp 36.6 °C (97.8 °F) (Temporal)   Resp 16   Ht 1.702 m (5' 7.01\")   Wt 83.5 kg (184 lb)   SpO2 98%   BMI 28.81 kg/m²      Physical Exam  Gen.- alert, cooperative, in no acute distress  Neck- midline trachea, thyroid not enlarged or tender,supple, no cervical adenopathy  Chest-clear to auscultation and percussion with normal breath sounds. No retractions. Chest wall nontender  Cardiac- regular rhythm and rate. No murmur, thrill, or heave  Abdomen- normal bowel sounds, soft without guarding. Liver and spleen not enlarged, no palpable masses or tenderness.          Assessment/Plan:       1. Normocytic normochromic anemia      2. High risk medication use      3. Chronic left hip pain    Plan: 1.  May continue ibuprofen-GI risks reviewed and patient advised to take it with food  2.  Continue methadone program  3.  Update CBC and CMP  "

## 2020-01-09 ENCOUNTER — HOSPITAL ENCOUNTER (OUTPATIENT)
Dept: LAB | Facility: MEDICAL CENTER | Age: 55
End: 2020-01-09
Attending: FAMILY MEDICINE
Payer: MEDICAID

## 2020-01-09 DIAGNOSIS — D64.9 NORMOCYTIC NORMOCHROMIC ANEMIA: ICD-10-CM

## 2020-01-09 DIAGNOSIS — Z79.899 HIGH RISK MEDICATION USE: ICD-10-CM

## 2020-01-09 LAB
ALBUMIN SERPL BCP-MCNC: 4.5 G/DL (ref 3.2–4.9)
ALBUMIN/GLOB SERPL: 1.4 G/DL
ALP SERPL-CCNC: 76 U/L (ref 30–99)
ALT SERPL-CCNC: 10 U/L (ref 2–50)
ANION GAP SERPL CALC-SCNC: 6 MMOL/L (ref 0–11.9)
AST SERPL-CCNC: 18 U/L (ref 12–45)
BASOPHILS # BLD AUTO: 0.5 % (ref 0–1.8)
BASOPHILS # BLD: 0.05 K/UL (ref 0–0.12)
BILIRUB SERPL-MCNC: 0.6 MG/DL (ref 0.1–1.5)
BUN SERPL-MCNC: 11 MG/DL (ref 8–22)
CALCIUM SERPL-MCNC: 9.3 MG/DL (ref 8.5–10.5)
CHLORIDE SERPL-SCNC: 104 MMOL/L (ref 96–112)
CO2 SERPL-SCNC: 27 MMOL/L (ref 20–33)
CREAT SERPL-MCNC: 0.75 MG/DL (ref 0.5–1.4)
EOSINOPHIL # BLD AUTO: 0.1 K/UL (ref 0–0.51)
EOSINOPHIL NFR BLD: 1 % (ref 0–6.9)
ERYTHROCYTE [DISTWIDTH] IN BLOOD BY AUTOMATED COUNT: 43.3 FL (ref 35.9–50)
GLOBULIN SER CALC-MCNC: 3.2 G/DL (ref 1.9–3.5)
GLUCOSE SERPL-MCNC: 82 MG/DL (ref 65–99)
HCT VFR BLD AUTO: 44.7 % (ref 42–52)
HGB BLD-MCNC: 14.1 G/DL (ref 14–18)
IMM GRANULOCYTES # BLD AUTO: 0.03 K/UL (ref 0–0.11)
IMM GRANULOCYTES NFR BLD AUTO: 0.3 % (ref 0–0.9)
LYMPHOCYTES # BLD AUTO: 2.16 K/UL (ref 1–4.8)
LYMPHOCYTES NFR BLD: 22.5 % (ref 22–41)
MCH RBC QN AUTO: 28.3 PG (ref 27–33)
MCHC RBC AUTO-ENTMCNC: 31.5 G/DL (ref 33.7–35.3)
MCV RBC AUTO: 89.6 FL (ref 81.4–97.8)
MONOCYTES # BLD AUTO: 0.63 K/UL (ref 0–0.85)
MONOCYTES NFR BLD AUTO: 6.6 % (ref 0–13.4)
NEUTROPHILS # BLD AUTO: 6.61 K/UL (ref 1.82–7.42)
NEUTROPHILS NFR BLD: 69.1 % (ref 44–72)
NRBC # BLD AUTO: 0 K/UL
NRBC BLD-RTO: 0 /100 WBC
PLATELET # BLD AUTO: 265 K/UL (ref 164–446)
PMV BLD AUTO: 10.9 FL (ref 9–12.9)
POTASSIUM SERPL-SCNC: 4 MMOL/L (ref 3.6–5.5)
PROT SERPL-MCNC: 7.7 G/DL (ref 6–8.2)
RBC # BLD AUTO: 4.99 M/UL (ref 4.7–6.1)
SODIUM SERPL-SCNC: 137 MMOL/L (ref 135–145)
WBC # BLD AUTO: 9.6 K/UL (ref 4.8–10.8)

## 2020-01-09 PROCEDURE — 36415 COLL VENOUS BLD VENIPUNCTURE: CPT

## 2020-01-09 PROCEDURE — 85025 COMPLETE CBC W/AUTO DIFF WBC: CPT

## 2020-01-09 PROCEDURE — 80053 COMPREHEN METABOLIC PANEL: CPT

## 2020-12-02 DIAGNOSIS — G89.29 CHRONIC LEFT HIP PAIN: ICD-10-CM

## 2020-12-02 DIAGNOSIS — M25.552 CHRONIC LEFT HIP PAIN: ICD-10-CM

## 2020-12-03 RX ORDER — IBUPROFEN 800 MG/1
TABLET ORAL
Qty: 270 TAB | Refills: 0 | Status: SHIPPED | OUTPATIENT
Start: 2020-12-03 | End: 2021-02-01

## 2021-03-04 ENCOUNTER — PATIENT MESSAGE (OUTPATIENT)
Dept: MEDICAL GROUP | Facility: MEDICAL CENTER | Age: 56
End: 2021-03-04

## 2021-03-04 DIAGNOSIS — B02.9 HERPES ZOSTER WITHOUT COMPLICATION: ICD-10-CM

## 2021-03-04 RX ORDER — HYDROCODONE BITARTRATE AND ACETAMINOPHEN 5; 325 MG/1; MG/1
1 TABLET ORAL EVERY 8 HOURS PRN
Qty: 30 TABLET | Refills: 0 | Status: SHIPPED | OUTPATIENT
Start: 2021-03-04 | End: 2021-03-14

## 2021-03-05 ENCOUNTER — TELEPHONE (OUTPATIENT)
Dept: MEDICAL GROUP | Facility: MEDICAL CENTER | Age: 56
End: 2021-03-05

## 2021-03-08 ENCOUNTER — TELEPHONE (OUTPATIENT)
Dept: MEDICAL GROUP | Facility: MEDICAL CENTER | Age: 56
End: 2021-03-08

## 2021-03-09 NOTE — TELEPHONE ENCOUNTER
DOCUMENTATION OF PAR STATUS:    1. Name of Medication & Dose: hydrocodone acetmin 5-325 mg     2. Name of Prescription Coverage Company & phone #: [facility]    3. Date Prior Auth Submitted: 3/8/2021     4. What information was given to obtain insurance decision? Medication list    5. Prior Auth Status? Pending    6. Patient Notified: N\A

## 2021-03-18 ENCOUNTER — PATIENT MESSAGE (OUTPATIENT)
Dept: MEDICAL GROUP | Facility: MEDICAL CENTER | Age: 56
End: 2021-03-18

## 2021-04-08 ENCOUNTER — OFFICE VISIT (OUTPATIENT)
Dept: MEDICAL GROUP | Facility: MEDICAL CENTER | Age: 56
End: 2021-04-08
Attending: FAMILY MEDICINE
Payer: MEDICAID

## 2021-04-08 VITALS
SYSTOLIC BLOOD PRESSURE: 118 MMHG | OXYGEN SATURATION: 96 % | DIASTOLIC BLOOD PRESSURE: 64 MMHG | WEIGHT: 174 LBS | HEIGHT: 67 IN | TEMPERATURE: 98.1 F | HEART RATE: 92 BPM | BODY MASS INDEX: 27.31 KG/M2 | RESPIRATION RATE: 16 BRPM

## 2021-04-08 DIAGNOSIS — Z00.00 HEALTHCARE MAINTENANCE: ICD-10-CM

## 2021-04-08 DIAGNOSIS — Z79.899 HIGH RISK MEDICATION USE: ICD-10-CM

## 2021-04-08 PROCEDURE — 99213 OFFICE O/P EST LOW 20 MIN: CPT | Performed by: FAMILY MEDICINE

## 2021-04-08 RX ORDER — METHADONE HYDROCHLORIDE 10 MG/5ML
20 SOLUTION ORAL EVERY 8 HOURS
COMMUNITY
End: 2022-02-28

## 2021-04-08 ASSESSMENT — PATIENT HEALTH QUESTIONNAIRE - PHQ9: CLINICAL INTERPRETATION OF PHQ2 SCORE: 0

## 2021-04-08 ASSESSMENT — FIBROSIS 4 INDEX: FIB4 SCORE: 1.18

## 2021-04-08 NOTE — PROGRESS NOTES
"Subjective:      Andrés Villatla is a 55 y.o. male who presents with Follow-Up            HPI 1. High risk medication use-patient returns for follow-up using ibuprofen 800 mg for chronic left hip and left heel pain. Patient reports that his use is actually diminished taking between 1 to 3 tablets/day. He is also still on methadone although that dosage has been reduced as well down to 20 mg and he is planning on discontinuing methadone completely in the next 2 months. Not reporting any black or bloody stools or epigastric pain.    ROS patient notes allergic runny nose which he treats with as needed Afrin. (Previous trial of Flonase was ineffective and he feels odd when he takes oral Claritin). Negative for chest pain, ankle edema       Objective:     /64 (BP Location: Left arm, Patient Position: Sitting, BP Cuff Size: Adult)   Pulse 92   Temp 36.7 °C (98.1 °F) (Temporal)   Resp 16   Ht 1.702 m (5' 7.01\")   Wt 78.9 kg (174 lb)   SpO2 96%   BMI 27.25 kg/m²      Physical Exam  Gen.- alert, cooperative, in no acute distress  Neck- midline trachea, thyroid not enlarged or tender,supple, no cervical adenopathy  Chest-clear to auscultation and percussion with normal breath sounds. No retractions. Chest wall nontender  Cardiac- regular rhythm and rate. No murmur, thrill, or heave  Abdomen- normal bowel sounds, soft without guarding. Liver and spleen not enlarged, no palpable masses or tenderness.          Assessment/Plan:        1. High risk medication use    - Comp Metabolic Panel; Future  - CBC WITH DIFFERENTIAL; Future    2. Healthcare maintenance    - Lipid Profile; Future  Plan: 1. Update fasting lipids, CMP, CBC  2. Follow-up with me within 1 year  "

## 2021-05-28 ENCOUNTER — HOSPITAL ENCOUNTER (OUTPATIENT)
Dept: LAB | Facility: MEDICAL CENTER | Age: 56
End: 2021-05-28
Attending: FAMILY MEDICINE
Payer: MEDICAID

## 2021-05-28 DIAGNOSIS — Z00.00 HEALTHCARE MAINTENANCE: ICD-10-CM

## 2021-05-28 DIAGNOSIS — Z79.899 HIGH RISK MEDICATION USE: ICD-10-CM

## 2021-05-28 LAB
BASOPHILS # BLD AUTO: 0.6 % (ref 0–1.8)
BASOPHILS # BLD: 0.04 K/UL (ref 0–0.12)
EOSINOPHIL # BLD AUTO: 0.15 K/UL (ref 0–0.51)
EOSINOPHIL NFR BLD: 2.3 % (ref 0–6.9)
ERYTHROCYTE [DISTWIDTH] IN BLOOD BY AUTOMATED COUNT: 41.6 FL (ref 35.9–50)
HCT VFR BLD AUTO: 40 % (ref 42–52)
HGB BLD-MCNC: 12.8 G/DL (ref 14–18)
IMM GRANULOCYTES # BLD AUTO: 0.03 K/UL (ref 0–0.11)
IMM GRANULOCYTES NFR BLD AUTO: 0.5 % (ref 0–0.9)
LYMPHOCYTES # BLD AUTO: 1.6 K/UL (ref 1–4.8)
LYMPHOCYTES NFR BLD: 24.5 % (ref 22–41)
MCH RBC QN AUTO: 28 PG (ref 27–33)
MCHC RBC AUTO-ENTMCNC: 32 G/DL (ref 33.7–35.3)
MCV RBC AUTO: 87.5 FL (ref 81.4–97.8)
MONOCYTES # BLD AUTO: 0.62 K/UL (ref 0–0.85)
MONOCYTES NFR BLD AUTO: 9.5 % (ref 0–13.4)
NEUTROPHILS # BLD AUTO: 4.1 K/UL (ref 1.82–7.42)
NEUTROPHILS NFR BLD: 62.6 % (ref 44–72)
NRBC # BLD AUTO: 0 K/UL
NRBC BLD-RTO: 0 /100 WBC
PLATELET # BLD AUTO: 210 K/UL (ref 164–446)
PMV BLD AUTO: 10.8 FL (ref 9–12.9)
RBC # BLD AUTO: 4.57 M/UL (ref 4.7–6.1)
WBC # BLD AUTO: 6.5 K/UL (ref 4.8–10.8)

## 2021-05-28 PROCEDURE — 80053 COMPREHEN METABOLIC PANEL: CPT

## 2021-05-28 PROCEDURE — 36415 COLL VENOUS BLD VENIPUNCTURE: CPT

## 2021-05-28 PROCEDURE — 85025 COMPLETE CBC W/AUTO DIFF WBC: CPT

## 2021-05-28 PROCEDURE — 80061 LIPID PANEL: CPT

## 2021-05-29 LAB
ALBUMIN SERPL BCP-MCNC: 4.1 G/DL (ref 3.2–4.9)
ALBUMIN/GLOB SERPL: 1.2 G/DL
ALP SERPL-CCNC: 87 U/L (ref 30–99)
ALT SERPL-CCNC: 18 U/L (ref 2–50)
ANION GAP SERPL CALC-SCNC: 8 MMOL/L (ref 7–16)
AST SERPL-CCNC: 20 U/L (ref 12–45)
BILIRUB SERPL-MCNC: 0.3 MG/DL (ref 0.1–1.5)
BUN SERPL-MCNC: 17 MG/DL (ref 8–22)
CALCIUM SERPL-MCNC: 9.2 MG/DL (ref 8.5–10.5)
CHLORIDE SERPL-SCNC: 106 MMOL/L (ref 96–112)
CHOLEST SERPL-MCNC: 176 MG/DL (ref 100–199)
CO2 SERPL-SCNC: 26 MMOL/L (ref 20–33)
CREAT SERPL-MCNC: 0.66 MG/DL (ref 0.5–1.4)
FASTING STATUS PATIENT QL REPORTED: NORMAL
GLOBULIN SER CALC-MCNC: 3.5 G/DL (ref 1.9–3.5)
GLUCOSE SERPL-MCNC: 99 MG/DL (ref 65–99)
HDLC SERPL-MCNC: 39 MG/DL
LDLC SERPL CALC-MCNC: 122 MG/DL
POTASSIUM SERPL-SCNC: 4.6 MMOL/L (ref 3.6–5.5)
PROT SERPL-MCNC: 7.6 G/DL (ref 6–8.2)
SODIUM SERPL-SCNC: 140 MMOL/L (ref 135–145)
TRIGL SERPL-MCNC: 74 MG/DL (ref 0–149)

## 2021-06-02 ENCOUNTER — TELEPHONE (OUTPATIENT)
Dept: MEDICAL GROUP | Facility: MEDICAL CENTER | Age: 56
End: 2021-06-02

## 2021-06-02 NOTE — TELEPHONE ENCOUNTER
Phone Number Called: [phone] (home)       Call outcome: Spoke to patient regarding message below.    Message: Recent lab work shows good total cholesterol with a mild elevation 22 points above normal for undesirable LDL bad cholesterol.  Modest limitation on red meat, cheese, egg yolks is suggested and pursuing exercise when possible.  CHEM panel shows normal kidney and liver function.  Blood cell count however shows mild shortage of red blood cells (anemia).  Sometimes blood cells are lost silently due to a developing colon cancer.  One was patient's last colonoscopy?       Pt states last colonoscopy should be in the media. He says it was about 3 years ag

## 2021-06-02 NOTE — TELEPHONE ENCOUNTER
----- Message from Zaire Celis M.D. sent at 5/31/2021 11:48 AM PDT -----  Recent lab work shows good total cholesterol with a mild elevation 22 points above normal for undesirable LDL bad cholesterol.  Modest limitation on red meat, cheese, egg yolks is suggested and pursuing exercise when possible.  CHEM panel shows normal kidney and liver function.  Blood cell count however shows mild shortage of red blood cells (anemia).  Sometimes blood cells are lost silently due to a developing colon cancer.  One was patient's last colonoscopy?

## 2021-06-03 ENCOUNTER — PATIENT MESSAGE (OUTPATIENT)
Dept: MEDICAL GROUP | Facility: MEDICAL CENTER | Age: 56
End: 2021-06-03

## 2021-07-07 ENCOUNTER — PATIENT MESSAGE (OUTPATIENT)
Dept: MEDICAL GROUP | Facility: MEDICAL CENTER | Age: 56
End: 2021-07-07

## 2021-07-07 DIAGNOSIS — D64.9 ANEMIA, UNSPECIFIED TYPE: ICD-10-CM

## 2021-09-22 ENCOUNTER — HOSPITAL ENCOUNTER (OUTPATIENT)
Dept: LAB | Facility: MEDICAL CENTER | Age: 56
End: 2021-09-22
Attending: FAMILY MEDICINE
Payer: MEDICAID

## 2021-09-22 DIAGNOSIS — D64.9 ANEMIA, UNSPECIFIED TYPE: ICD-10-CM

## 2021-09-22 LAB
ANISOCYTOSIS BLD QL SMEAR: ABNORMAL
BASOPHILS # BLD AUTO: 2.6 % (ref 0–1.8)
BASOPHILS # BLD: 0.16 K/UL (ref 0–0.12)
DACRYOCYTES BLD QL SMEAR: NORMAL
EOSINOPHIL # BLD AUTO: 0.11 K/UL (ref 0–0.51)
EOSINOPHIL NFR BLD: 1.7 % (ref 0–6.9)
ERYTHROCYTE [DISTWIDTH] IN BLOOD BY AUTOMATED COUNT: 43 FL (ref 35.9–50)
FERRITIN SERPL-MCNC: 104 NG/ML (ref 22–322)
HCT VFR BLD AUTO: 41.9 % (ref 42–52)
HGB BLD-MCNC: 13 G/DL (ref 14–18)
LYMPHOCYTES # BLD AUTO: 1.81 K/UL (ref 1–4.8)
LYMPHOCYTES NFR BLD: 28.7 % (ref 22–41)
MACROCYTES BLD QL SMEAR: ABNORMAL
MANUAL DIFF BLD: NORMAL
MCH RBC QN AUTO: 27.7 PG (ref 27–33)
MCHC RBC AUTO-ENTMCNC: 31 G/DL (ref 33.7–35.3)
MCV RBC AUTO: 89.3 FL (ref 81.4–97.8)
MICROCYTES BLD QL SMEAR: ABNORMAL
MONOCYTES # BLD AUTO: 0.22 K/UL (ref 0–0.85)
MONOCYTES NFR BLD AUTO: 3.5 % (ref 0–13.4)
MORPHOLOGY BLD-IMP: NORMAL
NEUTROPHILS # BLD AUTO: 4 K/UL (ref 1.82–7.42)
NEUTROPHILS NFR BLD: 63.5 % (ref 44–72)
NRBC # BLD AUTO: 0 K/UL
NRBC BLD-RTO: 0 /100 WBC
PLATELET # BLD AUTO: 185 K/UL (ref 164–446)
PLATELET BLD QL SMEAR: NORMAL
PMV BLD AUTO: 11.5 FL (ref 9–12.9)
POIKILOCYTOSIS BLD QL SMEAR: NORMAL
RBC # BLD AUTO: 4.69 M/UL (ref 4.7–6.1)
RBC BLD AUTO: PRESENT
WBC # BLD AUTO: 6.3 K/UL (ref 4.8–10.8)

## 2021-09-22 PROCEDURE — 82728 ASSAY OF FERRITIN: CPT

## 2021-09-22 PROCEDURE — 36415 COLL VENOUS BLD VENIPUNCTURE: CPT

## 2021-09-22 PROCEDURE — 85007 BL SMEAR W/DIFF WBC COUNT: CPT

## 2021-09-22 PROCEDURE — 85027 COMPLETE CBC AUTOMATED: CPT

## 2021-09-24 ENCOUNTER — TELEPHONE (OUTPATIENT)
Dept: MEDICAL GROUP | Facility: MEDICAL CENTER | Age: 56
End: 2021-09-24

## 2021-09-24 NOTE — TELEPHONE ENCOUNTER
----- Message from Zaire Ceils M.D. sent at 9/23/2021  7:17 PM PDT -----  Results show a reasonable, normal iron level with your ferritin at 104 (range ).  White blood cell count is normal.  Red blood cell count still shows a mild anemia with a hemoglobin of 13.0 up 2/10 of a point from May 28.  We saw similar mild anemia in October 2019.  Because is not obviously iron deficiency.  Has patient had a colonoscopy evaluate the colon for possible chronic low-grade blood loss?

## 2021-09-24 NOTE — TELEPHONE ENCOUNTER
Patient called in regards to the voicemail left. He voiced understanding with the results but has questions on the colonoscopy he was wondering if he had to have another one since message wasn't clear. Please call patient for any questions or concerns.

## 2021-09-24 NOTE — TELEPHONE ENCOUNTER
Phone Number Called: [phone] (home)       Call outcome: Left detailed message for patient. Informed to call back with any additional questions.    Message:  Results show a reasonable, normal iron level with your ferritin at 104 (range ).  White blood cell count is normal.  Red blood cell count still shows a mild anemia with a hemoglobin of 13.0 up 2/10 of a point from May 28.  We saw similar mild anemia in October 2019.  Because is not obviously iron deficiency.  Has patient had a colonoscopy evaluate the colon for possible chronic low-grade blood loss?

## 2021-09-30 ENCOUNTER — PATIENT MESSAGE (OUTPATIENT)
Dept: MEDICAL GROUP | Facility: MEDICAL CENTER | Age: 56
End: 2021-09-30

## 2021-09-30 DIAGNOSIS — D64.9 ANEMIA, UNSPECIFIED TYPE: ICD-10-CM

## 2021-10-13 DIAGNOSIS — M25.552 CHRONIC LEFT HIP PAIN: ICD-10-CM

## 2021-10-13 DIAGNOSIS — G89.29 CHRONIC LEFT HIP PAIN: ICD-10-CM

## 2021-10-14 RX ORDER — IBUPROFEN 800 MG/1
TABLET ORAL
Qty: 270 TABLET | Refills: 0 | Status: SHIPPED | OUTPATIENT
Start: 2021-10-14 | End: 2022-03-31

## 2022-02-17 ENCOUNTER — PATIENT MESSAGE (OUTPATIENT)
Dept: MEDICAL GROUP | Facility: MEDICAL CENTER | Age: 57
End: 2022-02-17
Payer: MEDICAID

## 2022-02-17 DIAGNOSIS — Z79.899 HIGH RISK MEDICATION USE: ICD-10-CM

## 2022-02-23 LAB
ALBUMIN SERPL-MCNC: 4.4 G/DL (ref 3.8–4.9)
ALBUMIN/GLOB SERPL: 1.3 {RATIO} (ref 1.2–2.2)
ALP SERPL-CCNC: 97 IU/L (ref 44–121)
ALT SERPL-CCNC: 17 IU/L (ref 0–44)
AST SERPL-CCNC: 22 IU/L (ref 0–40)
BASOPHILS # BLD AUTO: 0 X10E3/UL (ref 0–0.2)
BASOPHILS NFR BLD AUTO: 1 %
BILIRUB SERPL-MCNC: 0.2 MG/DL (ref 0–1.2)
BUN SERPL-MCNC: 12 MG/DL (ref 6–24)
BUN/CREAT SERPL: 14 (ref 9–20)
CALCIUM SERPL-MCNC: 9.4 MG/DL (ref 8.7–10.2)
CHLORIDE SERPL-SCNC: 105 MMOL/L (ref 96–106)
CO2 SERPL-SCNC: 21 MMOL/L (ref 20–29)
CREAT SERPL-MCNC: 0.86 MG/DL (ref 0.76–1.27)
EOSINOPHIL # BLD AUTO: 0.2 X10E3/UL (ref 0–0.4)
EOSINOPHIL NFR BLD AUTO: 2 %
ERYTHROCYTE [DISTWIDTH] IN BLOOD BY AUTOMATED COUNT: 13.8 % (ref 11.6–15.4)
GLOBULIN SER CALC-MCNC: 3.4 G/DL (ref 1.5–4.5)
GLUCOSE SERPL-MCNC: 98 MG/DL (ref 65–99)
HCT VFR BLD AUTO: 43 % (ref 37.5–51)
HGB BLD-MCNC: 14.8 G/DL (ref 13–17.7)
IMM GRANULOCYTES # BLD AUTO: 0 X10E3/UL (ref 0–0.1)
IMM GRANULOCYTES NFR BLD AUTO: 0 %
IMMATURE CELLS  115398: NORMAL
LYMPHOCYTES # BLD AUTO: 1.6 X10E3/UL (ref 0.7–3.1)
LYMPHOCYTES NFR BLD AUTO: 24 %
MCH RBC QN AUTO: 29.1 PG (ref 26.6–33)
MCHC RBC AUTO-ENTMCNC: 34.4 G/DL (ref 31.5–35.7)
MCV RBC AUTO: 85 FL (ref 79–97)
MONOCYTES # BLD AUTO: 0.5 X10E3/UL (ref 0.1–0.9)
MONOCYTES NFR BLD AUTO: 7 %
MORPHOLOGY BLD-IMP: NORMAL
NEUTROPHILS # BLD AUTO: 4.6 X10E3/UL (ref 1.4–7)
NEUTROPHILS NFR BLD AUTO: 66 %
NRBC BLD AUTO-RTO: NORMAL %
PLATELET # BLD AUTO: 218 X10E3/UL (ref 150–450)
POTASSIUM SERPL-SCNC: 4.5 MMOL/L (ref 3.5–5.2)
PROT SERPL-MCNC: 7.8 G/DL (ref 6–8.5)
RBC # BLD AUTO: 5.09 X10E6/UL (ref 4.14–5.8)
RETICS/RBC NFR AUTO: 1.1 % (ref 0.6–2.6)
SODIUM SERPL-SCNC: 141 MMOL/L (ref 134–144)
WBC # BLD AUTO: 7 X10E3/UL (ref 3.4–10.8)

## 2022-02-28 ENCOUNTER — TELEMEDICINE (OUTPATIENT)
Dept: MEDICAL GROUP | Facility: MEDICAL CENTER | Age: 57
End: 2022-02-28
Attending: FAMILY MEDICINE
Payer: MEDICAID

## 2022-02-28 VITALS — WEIGHT: 166 LBS | HEIGHT: 67 IN | BODY MASS INDEX: 26.06 KG/M2

## 2022-02-28 DIAGNOSIS — G89.29 CHRONIC LEFT HIP PAIN: ICD-10-CM

## 2022-02-28 DIAGNOSIS — G89.29 CHRONIC PAIN OF LEFT HEEL: ICD-10-CM

## 2022-02-28 DIAGNOSIS — M79.672 CHRONIC PAIN OF LEFT HEEL: ICD-10-CM

## 2022-02-28 DIAGNOSIS — M25.552 CHRONIC LEFT HIP PAIN: ICD-10-CM

## 2022-02-28 PROCEDURE — 99214 OFFICE O/P EST MOD 30 MIN: CPT | Performed by: FAMILY MEDICINE

## 2022-02-28 RX ORDER — METHADONE HYDROCHLORIDE 10 MG/5ML
20 SOLUTION ORAL DAILY
COMMUNITY

## 2022-02-28 ASSESSMENT — FIBROSIS 4 INDEX: FIB4 SCORE: 1.37
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
## 2022-03-02 ENCOUNTER — TELEPHONE (OUTPATIENT)
Dept: MEDICAL GROUP | Facility: MEDICAL CENTER | Age: 57
End: 2022-03-02
Payer: MEDICAID

## 2022-03-02 NOTE — TELEPHONE ENCOUNTER
DOCUMENTATION OF PAR STATUS:    1. Name of Medication & Dose: Tylenol 3      2. Name of Prescription Coverage Company & phone #: [employer]    3. Date Prior Auth Submitted: 03/02/2022    4. What information was given to obtain insurance decision? Chart notes, DX codes, Med history    5. Prior Auth Status? Pending    6. Patient Notified: yes

## 2022-03-31 ENCOUNTER — OFFICE VISIT (OUTPATIENT)
Dept: MEDICAL GROUP | Facility: MEDICAL CENTER | Age: 57
End: 2022-03-31
Attending: FAMILY MEDICINE
Payer: MEDICAID

## 2022-03-31 VITALS
RESPIRATION RATE: 16 BRPM | WEIGHT: 170 LBS | HEIGHT: 67 IN | DIASTOLIC BLOOD PRESSURE: 68 MMHG | BODY MASS INDEX: 26.68 KG/M2 | SYSTOLIC BLOOD PRESSURE: 118 MMHG | TEMPERATURE: 97.5 F | OXYGEN SATURATION: 96 % | HEART RATE: 76 BPM

## 2022-03-31 DIAGNOSIS — M25.552 CHRONIC LEFT HIP PAIN: ICD-10-CM

## 2022-03-31 DIAGNOSIS — G89.29 CHRONIC LEFT HIP PAIN: ICD-10-CM

## 2022-03-31 PROCEDURE — 99213 OFFICE O/P EST LOW 20 MIN: CPT | Performed by: FAMILY MEDICINE

## 2022-03-31 ASSESSMENT — FIBROSIS 4 INDEX: FIB4 SCORE: 1.37

## 2022-03-31 ASSESSMENT — PATIENT HEALTH QUESTIONNAIRE - PHQ9: CLINICAL INTERPRETATION OF PHQ2 SCORE: 0

## 2022-03-31 NOTE — PROGRESS NOTES
"Subjective     Andrés Villalta is a 56 y.o. male who presents with Follow-Up            HPI 1.  Chronic left hip pain-patient presents today for an in person visit.  Patient reports that the addition of Tylenol with codeine No. 3 (to 2 twice a day was helpful at alleviating lateral left hip pain.  Patient is also maintained on methadone 20 mg once daily.    ROS negative for constipation, confusion, abdominal pain           Objective     /68   Pulse 76   Temp 36.4 °C (97.5 °F) (Temporal)   Resp 16   Ht 1.702 m (5' 7.01\")   Wt 77.1 kg (170 lb)   SpO2 96%   BMI 26.62 kg/m²      Physical Exam      Gen.- alert, cooperative, in no acute distress  Neck- midline trachea, thyroid not enlarged or tender,supple, no cervical adenopathy  Chest-clear to auscultation and percussion with normal breath sounds. No retractions. Chest wall nontender  Cardiac- regular rhythm and rate. No murmur, thrill, or heave  Left lower extremity-1+ tender over the anterolateral aspect of the very upper left thigh                   Assessment & Plan        1. Chronic left hip pain    - acetaminophen-codeine #3 (TYLENOL #3) 300-30 MG Tab; Take 1 Tablet by mouth every 6 hours as needed for Moderate Pain for up to 30 days.  Dispense: 120 Tablet; Refill: 0  - acetaminophen-codeine #3 (TYLENOL #3) 300-30 MG Tab; Take 1 Tablet by mouth every 6 hours as needed for up to 30 days.  Dispense: 120 Tablet; Refill: 0  - acetaminophen-codeine #3 (TYLENOL #3) 300-30 MG Tab; Take 1 Tablet by mouth every 6 hours as needed for up to 30 days.  Dispense: 120 Tablet; Refill: 0     Plan: 1.  Tylenol 3, #120 refilled x3 months  2.  UDS today  3.  Follow-up in 3 months        "

## 2022-06-03 ENCOUNTER — PATIENT MESSAGE (OUTPATIENT)
Dept: MEDICAL GROUP | Facility: MEDICAL CENTER | Age: 57
End: 2022-06-03
Payer: MEDICAID

## 2022-06-09 ENCOUNTER — PATIENT MESSAGE (OUTPATIENT)
Dept: MEDICAL GROUP | Facility: MEDICAL CENTER | Age: 57
End: 2022-06-09

## 2022-06-09 ENCOUNTER — TELEMEDICINE (OUTPATIENT)
Dept: MEDICAL GROUP | Facility: MEDICAL CENTER | Age: 57
End: 2022-06-09
Attending: FAMILY MEDICINE
Payer: MEDICAID

## 2022-06-09 DIAGNOSIS — F11.90 METHADONE USE: ICD-10-CM

## 2022-06-09 DIAGNOSIS — G89.29 CHRONIC LEFT HIP PAIN: ICD-10-CM

## 2022-06-09 DIAGNOSIS — M25.552 CHRONIC LEFT HIP PAIN: ICD-10-CM

## 2022-06-09 PROCEDURE — 99213 OFFICE O/P EST LOW 20 MIN: CPT | Performed by: FAMILY MEDICINE

## 2022-06-09 NOTE — PROGRESS NOTES
Telemedicine Video Visit: Established Patient   This Remote Face to Face encounter was conducted via Zoom. Given the importance of social distancing and other strategies recommended to reduce the risk of COVID-19 transmission, I am providing medical care to this patient via audio/video visit in place of an in person visit at the request of the patient. Verbal consent to telehealth, risks, benefits, and consequences were discussed. Patient retains the right to withdraw at any time. All existing confidentiality protections apply. The patient has access to all transmitted medical information. No dissemination of any patient images or information to other entities without further written consent.  Subjective:   No chief complaint on file.      Andrés Villalta is a 56 y.o. male presenting for evaluation and management of:    1.  Chronic left hip pain-patient remains currently on stable methadone dose of 20 mg.  He supplements that with Tylenol 3 up to 4 /day.  When he does take them he typically takes to have a single dose.  Not reporting any confusion or constipation.  He reports overall pain is manageable and he is moderately active.  Is planning on doing a short-term stent as a .  UDS was collected at our last in office visit.  Apparently the sample was not properly forwarded to [employer] and they have no result.  Patient is available to resubmit sample possibly on Friday 6/17.  He lives at [facility].    ROS   Denies any recent fevers or chills. No nausea or vomiting. No chest pains or shortness of breath.     No Known Allergies    Current medicines (including changes today)  Current Outpatient Medications   Medication Sig Dispense Refill   • acetaminophen-codeine #3 (TYLENOL #3) 300-30 MG Tab Take 1 Tablet by mouth every 6 hours as needed for up to 30 days. 120 Tablet 0   • methadone (DOLOPHINE) 10 MG/5ML solution Take 20 mg by mouth every day.       No current facility-administered  medications for this visit.       Patient Active Problem List    Diagnosis Date Noted   • Methadone use 09/14/2017   • Gastroesophageal reflux disease without esophagitis 06/06/2017   • Anxiety 08/28/2015   • Recurrent headache 08/28/2015   • Chronic pain of left heel 08/28/2015   • Chronic left hip pain 08/28/2015   • Hepatitis C 08/28/2015   • Calcaneal fracture    • Hepatitis C    • Heroin user 06/30/2014       Family History   Problem Relation Age of Onset   • Cancer Mother 30        uterine   • Diabetes Neg Hx    • Heart Disease Neg Hx    • Stroke Neg Hx        He  has a past medical history of Calcaneal fracture (2009) and Hepatitis C (2011).  He  has a past surgical history that includes open reduction; facial fracture orif (2009); femur nailing intramedullary (Left, 2009); tonsillectomy; and eye muscle repair (Left, age 7).       Objective:   Vitals obtained by patient:  There were no vitals taken for this visit.    Physical Exam:  Constitutional: Alert, no distress, well-groomed.  Skin: No rashes in visible areas.  Eye: Round. Conjunctiva clear, lids normal. No icterus.   ENMT: Lips pink without lesions, good dentition, moist mucous membranes. Phonation normal.  Neck: No masses, no thyromegaly. Moves freely without pain.  CV: Pulse as reported by patient  Respiratory: Unlabored respiratory effort, no cough or audible wheeze  Psych: Alert and oriented x3, normal affect and mood.       Assessment and Plan:   The following treatment plan was discussed:     1. Chronic left hip pain    2. Methadone use        Follow-up: 1.  Resubmit UDS ([facility] send out)  2.  Continue Tylenol 3, 120/month x 3 months  3.  In person visit in 3 months (mid-September)    Face to Face Video Visit:   I spent 10 minutes with patient/guardian and I conducted this visit with audio and video present.  Zaire Celis M.D.

## 2022-08-26 ENCOUNTER — TELEPHONE (OUTPATIENT)
Dept: MEDICAL GROUP | Facility: MEDICAL CENTER | Age: 57
End: 2022-08-26

## 2022-08-26 NOTE — TELEPHONE ENCOUNTER
1. Caller Name: Aislinn                        Call Back Number: [phone] (home)         How would the patient prefer to be contacted with a response: Phone call OK to leave a detailed message    2. SPECIFIC Action To Be Taken:  New referral for Dermatologist in [place]    3. Diagnosis/Clinical Reason for Request: Concerning skin lesions    4. Specialty & Provider Name/Lab/Imaging Location: Dermatology    5. Is appointment scheduled for requested order/referral: no    Patient was informed they will receive a return phone call from the office ONLY if there are any questions before processing their request. Advised to call back if they haven't received a call from the referral department in 5 days.

## 2022-09-05 NOTE — TELEPHONE ENCOUNTER
Andrés, step 1 with regard to your skin concerns is to see you in our office and evaluate them in person.  Dr. Lewis

## 2022-09-27 ENCOUNTER — OFFICE VISIT (OUTPATIENT)
Dept: MEDICAL GROUP | Facility: MEDICAL CENTER | Age: 57
End: 2022-09-27
Attending: FAMILY MEDICINE
Payer: MEDICAID

## 2022-09-27 VITALS
HEIGHT: 67 IN | OXYGEN SATURATION: 97 % | HEART RATE: 68 BPM | WEIGHT: 160 LBS | SYSTOLIC BLOOD PRESSURE: 124 MMHG | BODY MASS INDEX: 25.11 KG/M2 | RESPIRATION RATE: 16 BRPM | DIASTOLIC BLOOD PRESSURE: 68 MMHG | TEMPERATURE: 97.8 F

## 2022-09-27 DIAGNOSIS — G89.29 CHRONIC LEFT HIP PAIN: ICD-10-CM

## 2022-09-27 DIAGNOSIS — G89.29 CHRONIC MIDLINE LOW BACK PAIN, UNSPECIFIED WHETHER SCIATICA PRESENT: ICD-10-CM

## 2022-09-27 DIAGNOSIS — M54.50 CHRONIC MIDLINE LOW BACK PAIN, UNSPECIFIED WHETHER SCIATICA PRESENT: ICD-10-CM

## 2022-09-27 DIAGNOSIS — M25.552 CHRONIC LEFT HIP PAIN: ICD-10-CM

## 2022-09-27 DIAGNOSIS — F11.90 METHADONE USE: ICD-10-CM

## 2022-09-27 PROCEDURE — 99212 OFFICE O/P EST SF 10 MIN: CPT | Performed by: FAMILY MEDICINE

## 2022-09-27 PROCEDURE — 99213 OFFICE O/P EST LOW 20 MIN: CPT | Performed by: FAMILY MEDICINE

## 2022-09-27 ASSESSMENT — FIBROSIS 4 INDEX: FIB4 SCORE: 1.4
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
## 2022-11-28 DIAGNOSIS — G89.29 CHRONIC MIDLINE LOW BACK PAIN, UNSPECIFIED WHETHER SCIATICA PRESENT: ICD-10-CM

## 2022-11-28 DIAGNOSIS — M54.50 CHRONIC MIDLINE LOW BACK PAIN, UNSPECIFIED WHETHER SCIATICA PRESENT: ICD-10-CM

## 2022-11-28 DIAGNOSIS — G89.29 CHRONIC LEFT HIP PAIN: ICD-10-CM

## 2022-11-28 DIAGNOSIS — M25.552 CHRONIC LEFT HIP PAIN: ICD-10-CM
